# Patient Record
Sex: MALE | Race: ASIAN | NOT HISPANIC OR LATINO | Employment: FULL TIME | URBAN - METROPOLITAN AREA
[De-identification: names, ages, dates, MRNs, and addresses within clinical notes are randomized per-mention and may not be internally consistent; named-entity substitution may affect disease eponyms.]

---

## 2024-06-18 ENCOUNTER — HOSPITAL ENCOUNTER (EMERGENCY)
Facility: CLINIC | Age: 39
Discharge: HOME | End: 2024-06-18
Attending: PHYSICIAN ASSISTANT | Admitting: PHYSICIAN ASSISTANT
Payer: OTHER MISCELLANEOUS

## 2024-06-18 ENCOUNTER — APPOINTMENT (OUTPATIENT)
Dept: GENERAL RADIOLOGY | Facility: CLINIC | Age: 39
End: 2024-06-18
Attending: EMERGENCY MEDICINE
Payer: OTHER MISCELLANEOUS

## 2024-06-18 VITALS
OXYGEN SATURATION: 100 % | DIASTOLIC BLOOD PRESSURE: 94 MMHG | HEIGHT: 65 IN | RESPIRATION RATE: 18 BRPM | WEIGHT: 140 LBS | SYSTOLIC BLOOD PRESSURE: 133 MMHG | BODY MASS INDEX: 23.32 KG/M2 | HEART RATE: 77 BPM | TEMPERATURE: 98.4 F
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
## 2024-06-18 DIAGNOSIS — S60.021A CONTUSION OF RIGHT INDEX FINGER WITHOUT DAMAGE TO NAIL, INITIAL ENCOUNTER: ICD-10-CM

## 2024-06-18 DIAGNOSIS — S60.419A ABRASION OF FINGER, INITIAL ENCOUNTER: ICD-10-CM

## 2024-06-18 PROCEDURE — 99284 EMERGENCY DEPT VISIT MOD MDM: CPT | Mod: 25

## 2024-06-18 PROCEDURE — 29130 APPL FINGER SPLINT STATIC: CPT | Mod: F6

## 2024-06-18 PROCEDURE — 250N000011 HC RX IP 250 OP 636: Mod: JZ | Performed by: PHYSICIAN ASSISTANT

## 2024-06-18 PROCEDURE — 90715 TDAP VACCINE 7 YRS/> IM: CPT | Mod: JZ | Performed by: PHYSICIAN ASSISTANT

## 2024-06-18 PROCEDURE — 73130 X-RAY EXAM OF HAND: CPT | Mod: RT

## 2024-06-18 PROCEDURE — 250N000013 HC RX MED GY IP 250 OP 250 PS 637: Performed by: PHYSICIAN ASSISTANT

## 2024-06-18 PROCEDURE — 90471 IMMUNIZATION ADMIN: CPT | Performed by: PHYSICIAN ASSISTANT

## 2024-06-18 RX ORDER — IBUPROFEN 600 MG/1
600 TABLET, FILM COATED ORAL ONCE
Status: COMPLETED | OUTPATIENT
Start: 2024-06-18 | End: 2024-06-18

## 2024-06-18 RX ADMIN — CLOSTRIDIUM TETANI TOXOID ANTIGEN (FORMALDEHYDE INACTIVATED), CORYNEBACTERIUM DIPHTHERIAE TOXOID ANTIGEN (FORMALDEHYDE INACTIVATED), BORDETELLA PERTUSSIS TOXOID ANTIGEN (GLUTARALDEHYDE INACTIVATED), BORDETELLA PERTUSSIS FILAMENTOUS HEMAGGLUTININ ANTIGEN (FORMALDEHYDE INACTIVATED), BORDETELLA PERTUSSIS PERTACTIN ANTIGEN, AND BORDETELLA PERTUSSIS FIMBRIAE 2/3 ANTIGEN 0.5 ML: 5; 2; 2.5; 5; 3; 5 INJECTION, SUSPENSION INTRAMUSCULAR at 22:37

## 2024-06-18 RX ADMIN — IBUPROFEN 600 MG: 600 TABLET, FILM COATED ORAL at 22:37

## 2024-06-18 ASSESSMENT — ACTIVITIES OF DAILY LIVING (ADL): ADLS_ACUITY_SCORE: 33

## 2024-06-18 ASSESSMENT — COLUMBIA-SUICIDE SEVERITY RATING SCALE - C-SSRS
2. HAVE YOU ACTUALLY HAD ANY THOUGHTS OF KILLING YOURSELF IN THE PAST MONTH?: NO
6. HAVE YOU EVER DONE ANYTHING, STARTED TO DO ANYTHING, OR PREPARED TO DO ANYTHING TO END YOUR LIFE?: NO
1. IN THE PAST MONTH, HAVE YOU WISHED YOU WERE DEAD OR WISHED YOU COULD GO TO SLEEP AND NOT WAKE UP?: NO

## 2024-06-19 NOTE — ED PROVIDER NOTES
"  Emergency Department Note      History of Present Illness     Chief Complaint  Hand Pain    ROSENDO Gilmore is a 38 year old male who presents to the ED for evaluation of right hand pain. The patient states that earlier tonight he was unloading kegs at work when his right index finger was crushed between a wall and a keg. He states that he cannot bend his finger. He reports no chronic health conditions. Unsure his last tetanus vaccination.    Independent Historian  None    Review of External Notes  None  Past Medical History   Medical History and Problem List  No past medical history on file.    Medications  No current outpatient medications on file.    Surgical History   No past surgical history on file.    Physical Exam   Patient Vitals for the past 24 hrs:   BP Temp Temp src Pulse Resp SpO2 Height Weight   06/18/24 2148 (!) 133/94 98.4  F (36.9  C) Temporal 77 18 100 % 1.651 m (5' 5\") 63.5 kg (140 lb)     Physical Exam  Constitutional: Alert, attentive, GCS 15  HENT:    Nose: Nose normal.    Mouth/Throat: Oropharynx is clear, mucous membranes are moist   Eyes: EOM are normal.   CV: regular rate and rhythm; no murmurs, rubs or gallups. Radial pulses equal bilaterally.  Chest: Effort normal and breath sounds normal.   MSK: Contusion with superficial abrasion to right index finger between PIP and DIP joint. Pain with active extension and flexion of finger. Distal sensation in right index finger maintained. Normal, brisk capillary refill in right index finger maintained. No open fracture. Bilateral hand  equal.  Neurological: Alert, attentive  Skin: Skin is warm and dry.    Diagnostics   Lab Results   Labs Ordered and Resulted from Time of ED Arrival to Time of ED Departure - No data to display    Imaging  XR Hand Right G/E 3 Views   Final Result   IMPRESSION: No definitive evidence for acute fracture. Mild angulation to the mid aspect of the right fifth metacarpal may be due to old healed fracture. No " dislocation or significant soft tissue abnormality.        Independent Interpretation  X-ray right hand shows no definitive evidence for acute fracture or dislocation.   ED Course    Medications Administered  Medications   Tdap (tetanus-diphtheria-acell pertussis) (ADACEL) injection 0.5 mL (0.5 mLs Intramuscular $Given 6/18/24 2237)   ibuprofen (ADVIL/MOTRIN) tablet 600 mg (600 mg Oral $Given 6/18/24 2237)       Procedures  Procedures     Discussion of Management  None    Social Determinants of Health adding to complexity of care  None    ED Course  ED Course as of 06/18/24 2321 Tue Jun 18, 2024 2224 I obtained history and examined the patient as noted above.      Medical Decision Making / Diagnosis   CMS Diagnoses: None    MIPS     None    MDM  Will D Deirdre is a 38 year old male who presents with crush injury to his right index finger after catching his digit between a keg and a wall while at work this evening.  Vitals are appropriate.  Physical examination reveals right index finger is neurovascularly intact.  There is evidence of a contusion with a superficial abrasion not requiring primary closure.  X-rays of the right index finger showed no acute fracture or dislocation.  Results were reviewed and discussed with patient at bedside.  Abrasion was irrigated and dressed and finger splint applied.  Suspect symptoms are related to a contusion of the finger after crush injury.  No evidence of compartment syndrome or neurovascular compromise.  Tetanus updated. Recommend supportive cares for home including rest, ice, and elevation.  He may take Tylenol or ibuprofen for pain.  Recommend he follow-up with primary care for recheck if symptoms do not improve in the next 5 to 7 days.  Patient comfortable with plan was discharged home.    Disposition  The patient was discharged.     ICD-10 Codes:    ICD-10-CM    1. Contusion of right index finger without damage to nail, initial encounter  S60.021A       2. Abrasion of  finger, initial encounter  S60.419A       3. Work related injury  Y99.0          Discharge Medications  There are no discharge medications for this patient.    I, Faviola William, am serving as a scribe at 10:23 PM on 6/18/2024 to document services personally performed by Odilia Garcia PA-C based on my observations and the provider's statements to me.      Odilia Garcia PA-C  06/18/24 [id number]

## 2024-06-19 NOTE — ED TRIAGE NOTES
Arrives from the community. States that he injured his index finger on the right hand. States he was pulled kegs off the truck and the finger got caught better the keg and the wall.